# Patient Record
Sex: MALE
[De-identification: names, ages, dates, MRNs, and addresses within clinical notes are randomized per-mention and may not be internally consistent; named-entity substitution may affect disease eponyms.]

---

## 2019-04-17 ENCOUNTER — INBOUND DOCUMENT (OUTPATIENT)
Age: 54
End: 2019-04-17

## 2019-04-17 ENCOUNTER — EMERGENCY (EMERGENCY)
Facility: HOSPITAL | Age: 54
LOS: 1 days | Discharge: HOME | End: 2019-04-17
Attending: EMERGENCY MEDICINE | Admitting: EMERGENCY MEDICINE
Payer: COMMERCIAL

## 2019-04-17 VITALS
HEART RATE: 58 BPM | SYSTOLIC BLOOD PRESSURE: 101 MMHG | RESPIRATION RATE: 18 BRPM | TEMPERATURE: 98 F | DIASTOLIC BLOOD PRESSURE: 53 MMHG | OXYGEN SATURATION: 100 %

## 2019-04-17 VITALS
TEMPERATURE: 99 F | SYSTOLIC BLOOD PRESSURE: 93 MMHG | OXYGEN SATURATION: 99 % | HEART RATE: 71 BPM | RESPIRATION RATE: 18 BRPM | DIASTOLIC BLOOD PRESSURE: 56 MMHG

## 2019-04-17 DIAGNOSIS — R42 DIZZINESS AND GIDDINESS: ICD-10-CM

## 2019-04-17 DIAGNOSIS — K52.9 NONINFECTIVE GASTROENTERITIS AND COLITIS, UNSPECIFIED: ICD-10-CM

## 2019-04-17 LAB
ALBUMIN SERPL ELPH-MCNC: 2.9 G/DL — LOW (ref 3.4–5)
ALP SERPL-CCNC: 47 U/L — SIGNIFICANT CHANGE UP (ref 40–120)
ALT FLD-CCNC: 48 U/L — HIGH (ref 12–42)
ANION GAP SERPL CALC-SCNC: 7 MMOL/L — LOW (ref 9–16)
AST SERPL-CCNC: 82 U/L — HIGH (ref 15–37)
BASOPHILS NFR BLD AUTO: 0 % — SIGNIFICANT CHANGE UP (ref 0–2)
BILIRUB SERPL-MCNC: 0.4 MG/DL — SIGNIFICANT CHANGE UP (ref 0.2–1.2)
BUN SERPL-MCNC: 24 MG/DL — HIGH (ref 7–23)
CALCIUM SERPL-MCNC: 8.6 MG/DL — SIGNIFICANT CHANGE UP (ref 8.5–10.5)
CHLORIDE SERPL-SCNC: 102 MMOL/L — SIGNIFICANT CHANGE UP (ref 96–108)
CO2 SERPL-SCNC: 27 MMOL/L — SIGNIFICANT CHANGE UP (ref 22–31)
CREAT SERPL-MCNC: 1.23 MG/DL — SIGNIFICANT CHANGE UP (ref 0.5–1.3)
EOSINOPHIL NFR BLD AUTO: 0 % — SIGNIFICANT CHANGE UP (ref 0–6)
GLUCOSE SERPL-MCNC: 109 MG/DL — HIGH (ref 70–99)
HCT VFR BLD CALC: 39.9 % — SIGNIFICANT CHANGE UP (ref 39–50)
HGB BLD-MCNC: 13.3 G/DL — SIGNIFICANT CHANGE UP (ref 13–17)
IMM GRANULOCYTES NFR BLD AUTO: 0 % — SIGNIFICANT CHANGE UP (ref 0–1.5)
LYMPHOCYTES # BLD AUTO: 18 % — SIGNIFICANT CHANGE UP (ref 13–44)
MCHC RBC-ENTMCNC: 32.4 PG — SIGNIFICANT CHANGE UP (ref 27–34)
MCHC RBC-ENTMCNC: 33.3 G/DL — SIGNIFICANT CHANGE UP (ref 32–36)
MCV RBC AUTO: 97.3 FL — SIGNIFICANT CHANGE UP (ref 80–100)
MONOCYTES NFR BLD AUTO: 5 % — SIGNIFICANT CHANGE UP (ref 2–14)
NEUTROPHILS NFR BLD AUTO: 29 % — LOW (ref 43–77)
PLAT MORPH BLD: NORMAL — SIGNIFICANT CHANGE UP
PLATELET # BLD AUTO: 134 K/UL — LOW (ref 150–400)
POTASSIUM SERPL-MCNC: 3.9 MMOL/L — SIGNIFICANT CHANGE UP (ref 3.5–5.3)
POTASSIUM SERPL-SCNC: 3.9 MMOL/L — SIGNIFICANT CHANGE UP (ref 3.5–5.3)
PROT SERPL-MCNC: 6.5 G/DL — SIGNIFICANT CHANGE UP (ref 6.4–8.2)
RBC # BLD: 4.1 M/UL — LOW (ref 4.2–5.8)
RBC # FLD: 13 % — SIGNIFICANT CHANGE UP (ref 10.3–14.5)
RBC BLD AUTO: NORMAL — SIGNIFICANT CHANGE UP
SODIUM SERPL-SCNC: 136 MMOL/L — SIGNIFICANT CHANGE UP (ref 132–145)
WBC # BLD: 6.9 K/UL — SIGNIFICANT CHANGE UP (ref 3.8–10.5)
WBC # FLD AUTO: 6.9 K/UL — SIGNIFICANT CHANGE UP (ref 3.8–10.5)

## 2019-04-17 PROCEDURE — 93010 ELECTROCARDIOGRAM REPORT: CPT | Mod: NC

## 2019-04-17 PROCEDURE — 99284 EMERGENCY DEPT VISIT MOD MDM: CPT | Mod: 25

## 2019-04-17 PROCEDURE — 74177 CT ABD & PELVIS W/CONTRAST: CPT | Mod: 26

## 2019-04-17 RX ORDER — SODIUM CHLORIDE 9 MG/ML
2000 INJECTION INTRAMUSCULAR; INTRAVENOUS; SUBCUTANEOUS ONCE
Refills: 0 | Status: COMPLETED | OUTPATIENT
Start: 2019-04-17 | End: 2019-04-17

## 2019-04-17 RX ORDER — ONDANSETRON 8 MG/1
4 TABLET, FILM COATED ORAL ONCE
Refills: 0 | Status: COMPLETED | OUTPATIENT
Start: 2019-04-17 | End: 2019-04-17

## 2019-04-17 RX ORDER — IOHEXOL 300 MG/ML
30 INJECTION, SOLUTION INTRAVENOUS ONCE
Refills: 0 | Status: COMPLETED | OUTPATIENT
Start: 2019-04-17 | End: 2019-04-17

## 2019-04-17 RX ORDER — FAMOTIDINE 10 MG/ML
20 INJECTION INTRAVENOUS ONCE
Refills: 0 | Status: COMPLETED | OUTPATIENT
Start: 2019-04-17 | End: 2019-04-17

## 2019-04-17 RX ADMIN — Medication 1 TABLET(S): at 20:18

## 2019-04-17 RX ADMIN — IOHEXOL 30 MILLILITER(S): 300 INJECTION, SOLUTION INTRAVENOUS at 15:53

## 2019-04-17 RX ADMIN — SODIUM CHLORIDE 2000 MILLILITER(S): 9 INJECTION INTRAMUSCULAR; INTRAVENOUS; SUBCUTANEOUS at 14:19

## 2019-04-17 RX ADMIN — SODIUM CHLORIDE 1000 MILLILITER(S): 9 INJECTION INTRAMUSCULAR; INTRAVENOUS; SUBCUTANEOUS at 14:18

## 2019-04-17 RX ADMIN — ONDANSETRON 4 MILLIGRAM(S): 8 TABLET, FILM COATED ORAL at 14:19

## 2019-04-17 RX ADMIN — FAMOTIDINE 20 MILLIGRAM(S): 10 INJECTION INTRAVENOUS at 14:18

## 2019-04-17 NOTE — ED ADULT TRIAGE NOTE - CHIEF COMPLAINT QUOTE
pt sent by md office for symptomatic hypotension; has had diarrhea for several days, endorses feeling near syncopal. bp in triage 93/56 pt sent by md office for symptomatic hypotension 80/50; has had diarrhea for several days, endorses feeling near syncopal. bp in triage 93/56

## 2019-04-17 NOTE — ED PROVIDER NOTE - CLINICAL SUMMARY MEDICAL DECISION MAKING FREE TEXT BOX
Pt presents with c/o abdominal cramping, dizziness, diarrhea, hypotension, and nausea. Pt hypotensive at triage. EKG reads sinus bradycardia otherwise normal. Give Pepcid and Zofran to treat abd pain. Check basic labs. Tenderness on left abdominal side on exam. CT imaging of abdomen/pelvis with PO and IV contrast ordered. Suspicion for diverticulitis. Repeat BP reading. Reassess.

## 2019-04-17 NOTE — ED PROVIDER NOTE - CONSTITUTIONAL, MLM
normal... Fatigued appearing, well nourished, awake, alert, oriented to person, place, time/situation and in no acute distress.

## 2019-04-17 NOTE — ED PROVIDER NOTE - CARE PROVIDER_API CALL
Vinny Licea (DO)  Medicine  Dept Director  [address], [facility] [place] [address]  Phone: [phone]  Fax: [phone]  Follow Up Time:     Luis Sahni (MD)  Gastroenterology; Internal Medicine  [address], [address] [place] [address]  Phone: [phone]  Fax: [phone]  Follow Up Time:

## 2019-04-17 NOTE — ED ADULT NURSE NOTE - CHIEF COMPLAINT QUOTE
pt sent by md office for symptomatic hypotension; has had diarrhea for several days, endorses feeling near syncopal. bp in triage 93/56

## 2019-04-17 NOTE — ED ADULT NURSE NOTE - NSIMPLEMENTINTERV_GEN_ALL_ED
Implemented All Universal Safety Interventions:  [place] to call system. Call bell, personal items and telephone within reach. Instruct patient to call for assistance. Room bathroom lighting operational. Non-slip footwear when patient is off stretcher. Physically safe environment: no spills, clutter or unnecessary equipment. Stretcher in lowest position, wheels locked, appropriate side rails in place.

## 2019-04-17 NOTE — ED ADULT NURSE NOTE - OBJECTIVE STATEMENT
here for dizziness, lower abdominal pain radiating to LLQ- states he has multiple bouts of diarrhea and slight nausea- denies fever, chills, recent travel

## 2019-04-17 NOTE — ED PROVIDER NOTE - OBJECTIVE STATEMENT
54 y o male with PMHx of H. Pylori presents to ED for symptomatic hypotension at [facility] urgent care. Pt states that for the last x3 days he has had watery diarrhea (7-8 BM's) with consistent lower abd cramping, nausea, and dizziness. Noted a pink hue in his stool, no blood clots. States he took imodium yesterday with mild improvement in BM frequency. Pt was dx with H. Pylori a few weeks ago and has taken abx for it. BP 93/56, HR 71, Temp 98.8 F at triage. Denies fevers, chills, vomiting, urinary sx, body aches, or other sx.

## 2019-04-17 NOTE — ED PROVIDER NOTE - NSFOLLOWUPINSTRUCTIONS_ED_ALL_ED_FT
Diarrhea    Diarrhea is frequent loose or watery bowel movements that has many causes. Diarrhea can make you feel weak and cause you to become dehydrated. Diarrhea typically lasts 2–3 days, but can last longer if it is a sign of something more serious. Drink clear fluids to prevent dehydration. Eat bland, easy-to-digest foods as tolerated.     SEEK IMMEDIATE MEDICAL CARE IF YOU HAVE ANY OF THE FOLLOWING SYMPTOMS: high fevers, lightheadedness/dizziness, chest pain, black or bloody stools, shortness of breath, severe abdominal or back pain, or any signs of dehydration.      What are probiotics?  Probiotics are what many people call "friendly bacteria" or "good bacteria." They are bacteria that live in the body and help it work well. Often, probiotics help defend the body from infections caused by unfriendly bacteria or other germs.    Probiotics get into your body on their own, so you can get benefits without doing or taking anything extra. But some people take pills that contain probiotics because they think the pills will help keep them healthy. Some people even take "pre-biotics," which are pills that contain a form of food that probiotics like. The problem is, even though there is good proof that probiotics help the body, there is still no good proof that taking probiotic pills does any good.      Do probiotic pills help improve health?  A few studies have hinted that probiotic pills might improve health, but others show no benefit.    Right now, researchers are studying whether probiotics:    ?Can help fight or prevent infections in the stomach or intestines, including a serious infection called "C. difficile" (or "C. diff")      ?Can help with diarrhea, constipation, and some of the conditions that cause these symptoms, such as irritable bowel syndrome, ulcerative colitis, and Crohn's disease      Should I eat yogurt with "active cultures"?  Yogurt products that have "active cultures" have probiotics in them. If you like yogurt and can digest it normally, there is probably no harm in eating it. It's possible that eating yogurt will help your digestion and help keep you healthy. If nothing else, low-fat yogurt can be a part of a healthy diet. Diarrhea    Diarrhea is frequent loose or watery bowel movements that has many causes. Diarrhea can make you feel weak and cause you to become dehydrated. Diarrhea typically lasts 2–3 days, but can last longer if it is a sign of something more serious. Drink clear fluids to prevent dehydration. Eat bland, easy-to-digest foods as tolerated.     SEEK IMMEDIATE MEDICAL CARE IF YOU HAVE ANY OF THE FOLLOWING SYMPTOMS: high fevers, lightheadedness/dizziness, chest pain, black or bloody stools, shortness of breath, severe abdominal or back pain, or any signs of dehydration.      What are probiotics?  Probiotics are what many people call "friendly bacteria" or "good bacteria." They are bacteria that live in the body and help it work well. Often, probiotics help defend the body from infections caused by unfriendly bacteria or other germs.    Probiotics get into your body on their own, so you can get benefits without doing or taking anything extra. But some people take pills that contain probiotics because they think the pills will help keep them healthy. Some people even take "pre-biotics," which are pills that contain a form of food that probiotics like. The problem is, even though there is good proof that probiotics help the body, there is still no good proof that taking probiotic pills does any good.      Do probiotic pills help improve health?  A few studies have hinted that probiotic pills might improve health, but others show no benefit.    Right now, researchers are studying whether probiotics:    ?Can help fight or prevent infections in the stomach or intestines, including a serious infection called "C. difficile" (or "C. diff")      ?Can help with diarrhea, constipation, and some of the conditions that cause these symptoms, such as irritable bowel syndrome, ulcerative colitis, and Crohn's disease      Should I eat yogurt with "active cultures"?  Yogurt products that have "active cultures" have probiotics in them. If you like yogurt and can digest it normally, there is probably no harm in eating it. It's possible that eating yogurt will help your digestion and help keep you healthy. If nothing else, low-fat yogurt can be a part of a healthy diet.    WHAT YOU NEED TO KNOW:    Microscopic colitis is long-term inflammation of your colon (large intestine). Inflammation can damage the lining of your colon and cause long-term diarrhea. Microscopic colitis may be caused by an infection, higher levels of acid in your colon, or the cause may not be known.Digestive Tract         DISCHARGE INSTRUCTIONS:    Call 911 for any of the following:     You have trouble breathing.         Return to the emergency department if:     You have any of the following signs of severe dehydration:   Dizziness or weakness      Dry mouth, cracked lips, or severe thirst      Fast heartbeat or breathing      Passing little to no urine      You have black or bright red stools.      You have blood in your vomit.       Your abdomen feels swollen or hard.    Contact your healthcare provider if:     You have a fever with abdominal pain that does not go away.      You have a fever, chills, cough, or feel weak and achy.       Your diarrhea gets worse.      Your symptoms do not improve or get worse.      You are losing weight without trying.      You have questions or concerns about your condition or care.    Medicines:     Medicines may be given to treat a bacterial infection, decrease inflammation in your colon, or treat diarrhea. You may also need medicine to decrease acid levels in your colon that could cause irritation.      Take your medicine as directed. Contact your healthcare provider if you think your medicine is not helping or if you have side effects. Tell him of her if you are allergic to any medicine. Keep a list of the medicines, vitamins, and herbs you take. Include the amounts, and when and why you take them. Bring the list or the pill bottles to follow-up visits. Carry your medicine list with you in case of an emergency.    Manage your symptoms:     Eat a variety of healthy foods. Healthy foods include fruits, vegetables, whole-grain breads, low-fat dairy products, beans, lean meats, and fish. You may need to eat several small meals throughout the day. Avoid spicy foods, caffeine, chocolate, and foods high in fat.      Drink liquids as directed to help prevent dehydration. Good liquids to drink include water, juice, and broth. Ask how much liquid to drink each day. You may need to drink an oral rehydration solution (ORS). An ORS contains a balance of water, salt, and sugar to replace body fluids lost during diarrhea.       Start to exercise when you feel better. Regular exercise helps your bowels work normally. Ask about the best exercise plan for you.      Ask about probiotics. You may need supplements that help balance the bacteria in your colon. This will help decrease you symptoms.     Prevent microscopic colitis:     Clean thoroughly. Wash your hands in warm, soapy water for 20 seconds before and after you handle food. Wash your hands after you use the bathroom, change a diaper, or touch an animal. Rinse fruits and vegetables in running water. Clean cutting boards, knives, countertops, and other areas where you prepare food before and after you cook. Wash sponges and dishtowels weekly in hot water.       Cook food all the way through. Cook eggs until the yolks are firm. Use a meat thermometer to make sure meat is heated to a temperature that will kill bacteria. Do not eat raw or undercooked chicken, turkey, seafood, or meat.       Store food properly. Refrigerate or freeze fruits and vegetables, cooked foods, and leftovers.       Drink safe water. Drink only treated water. Do not drink water from ponds or lakes, or from swimming pools that do not contain chlorine. Drink bottled water when traveling.    Follow up with your healthcare provider as directed: Keep a written record of your bowel movements. Include the color, form, and if they were bloody. Bring this to your follow-up visits. Write down your questions so you remember to ask them during your visits.

## 2020-02-28 NOTE — ED PROVIDER NOTE - CPE EDP SKIN NORM
"2/28/2020     Neurosurgery Clinic Note  Video Walter horniris       Reason for visit:         Left lower extremity lumbar radiculopathy     History of present illness:       Kieran is a 71 year old man who with hx of Left Minimally Invasive Lumbar 4-5 Hemilaminectomy And Microdiscectomy in February of 2018.   He continues to C/o left leg pain that radiates down the leg.  He pimentel not have back pain  Symptoms come and go   Describes as a \"burning pain\" down leg   No numbness or tingling.    No bladder incontinence.   No bowel incontinence.    He did not have physical therapy following his surgery    Current Pain Medications:    Current Outpatient Medications   Medication     aspirin 81 MG chewable tablet                          Out of medication      benzonatate (TESSALON) 100 MG capsule     calcium carbonate 600 mg-vitamin D 400 units (CALTRATE) 600-400 MG-UNIT per tablet     GABAPENTIN PO     polyethylene glycol (MIRALAX) powder     PRAVASTATIN SODIUM PO       Review of systems:  A ROS negative except for as detailed in HPI  Past Medical History:   Diagnosis Date     Hearing loss      Hyperlipidemia      Lumbar spondylosis      Surgical History:   Past Surgical History:   Procedure Laterality Date     COLONOSCOPY       DISCECTOMY LUMBAR MINIMALLY INVASIVE ONE LEVEL Left 2/27/2018     LAMINECTOMY LUMBAR MINIMALLY INVASIVE ONE LEVEL Left 2/27/2018    Procedure: LAMINECTOMY LUMBAR MINIMALLY INVASIVE ONE LEVEL;  Left Minimally Invasive Lumbar 4-5 Hemilaminectomy And Microdiscectomy;  Surgeon: Elizabeth Buenrostro MD;  Location: [facility] OR       Physical exam:   /74   Pulse 84   Resp 16   SpO2 95%     General: Awake and alert and in no acute distress.  Pulm: Breathing comfortably on room air  CN: Grossly intact.   Motor: No pronator drift.   Good muscle bulk throughout.   Bilateral lower extremities    5/5 including DF/PF/EHL  Able to heel walk / toe walk   Gait: Intact tandem gait. Negative " Romberg  Reflexes:  No Hyper-reflexia or ankle clonus     Imaging:   MR LUMBAR SPINE W/O & W CONTRAST 12/5/2019 5:45 PM    Findings: Postoperative changes of left hemilaminectomy and  microdiscectomy at the level of L4-5. Counting down from C2, there are  5 lumbar-type vertebra.  The tip of the conus medullaris is at L1.   Leftward convex scoliosis centered at L2.  There is disc height  narrowing at L1-2, L2-3, L4-5 and L5-S1.   Normal marrow signal.     T12-L1: Diffuse disc bulge with questionable annulus fissure. Disc  osteophyte complex. Mild spinal canal stenosis. No significant  neuroforaminal stenosis.     L1-2: Right eccentric disc bulge and disc osteophyte complex. Facet  arthropathy bilaterally. Spinal canal is patent.. Mild to moderate  right and mild left neural foraminal stenosis.     L2-3: Circumferential disc bulge with a superimposed right foraminal  disc protrusion, which narrows the lateral recesses and likely  impinges upon the traversing right L3 nerve root.. Endplate spurring.  Mild spinal canal stenosis. Moderate left and mild right  neuroforaminal stenosis.     L3-4: Disc osteophyte complex narrows the lateral recesses and likely  abuts and possibly impinges upon the traversing left L4 nerve root..  Facet arthropathy. Ligamentum flavum hypertrophy. No significant  spinal canal stenosis. Moderate severe left and moderate right  neuroforaminal stenosis.     L4-5: Postoperative changes of left hemilaminectomy and discectomy.  Circumferential disc bulge narrows the lateral recesses and may  impinge upon the traversing L5 nerve roots bilaterally. Asymmetric  right subarticular/foraminal soft tissue density protrusion with mild  contrast enhancement abutting right ventricle thecal sac and spinal  cord, likely postoperative scarring. Moderate spinal canal stenosis.  Moderate left, mild to moderate right neuroforaminal stenosis.     L5-S1: Disc osteophyte complex and facet arthropathy. Probable  annular  fissure (images 37 series 18). Mild spinal canal stenosis. Moderate  right and moderate to severe left neuroforaminal stenosis.     Left predominant paraspinous soft tissue enhancement at L4-5. There is  facet enhancement and edema at L4-5 and L5-S1 bilaterally, likely due  to inflammatory facet arthropathy.                                                               Impression: 1. Postoperative changes of left hemilaminectomy and  discectomy at L4-5. Asymmetric right subarticular/foraminal soft  tissue density with mild contrast enhancement abutting right lateral  recess, traversing left L5 nerve root, likely represents postoperative  scarring. Additionally, there is moderate spinal canal stenosis.  Moderate left, mild to moderate right neuroforaminal stenosis at the  same level.  2. Extensive multilevel lumbar spondylosis with leftward convex  scoliosis. Left greater than right moderate to severe neuroforaminal  stenosis at L3-4, and water to severe left and moderate right  neuroforaminal stenosis at L5-S1.  3. Active inflammatory arthropathy involving the facet joints of the  lower lumbar spine bilaterally.     I have personally reviewed the examination and initial interpretation  and I agree with the findings.     MARGARET WADDELL MD    Assessment:             Left lower extremity lumbar radiculopathy of L5     Plan:  -Epidural steroid Injection at L4-L5 targeted to the left L5  ~Return to clinic after injection to update his progress       Akilah Meléndez DNP  Neurosurgery Nurse Practitioner  [facility]  [phone]     normal...

## 2023-03-31 NOTE — ED ADULT NURSE NOTE - WEIGHT IN LBS
March 31, 2023     Patient: Rima Pyle   YOB: 2018   Date of Visit: 3/31/2023       To Whom it May Concern:    Rima Pyle was seen in my clinic on 3/31/2023  If you have any questions or concerns, please don't hesitate to call           Sincerely,          Enrriqeu Knight DO        CC: No Recipients
[phone]